# Patient Record
Sex: FEMALE | Race: WHITE | NOT HISPANIC OR LATINO | Employment: OTHER | URBAN - NONMETROPOLITAN AREA
[De-identification: names, ages, dates, MRNs, and addresses within clinical notes are randomized per-mention and may not be internally consistent; named-entity substitution may affect disease eponyms.]

---

## 2017-01-23 ENCOUNTER — OUTSIDE FACILITY SERVICE (OUTPATIENT)
Dept: CARDIOLOGY | Facility: CLINIC | Age: 82
End: 2017-01-23

## 2017-01-23 PROCEDURE — 99223 1ST HOSP IP/OBS HIGH 75: CPT | Performed by: INTERNAL MEDICINE

## 2017-01-24 ENCOUNTER — TELEPHONE (OUTPATIENT)
Dept: CARDIOLOGY | Facility: CLINIC | Age: 82
End: 2017-01-24

## 2017-01-24 ENCOUNTER — OUTSIDE FACILITY SERVICE (OUTPATIENT)
Dept: CARDIOLOGY | Facility: CLINIC | Age: 82
End: 2017-01-24

## 2017-01-24 PROCEDURE — 99232 SBSQ HOSP IP/OBS MODERATE 35: CPT | Performed by: INTERNAL MEDICINE

## 2017-02-22 ENCOUNTER — OFFICE VISIT (OUTPATIENT)
Dept: CARDIOLOGY | Facility: CLINIC | Age: 82
End: 2017-02-22

## 2017-02-22 VITALS
HEIGHT: 66 IN | WEIGHT: 194 LBS | BODY MASS INDEX: 31.18 KG/M2 | DIASTOLIC BLOOD PRESSURE: 62 MMHG | HEART RATE: 60 BPM | SYSTOLIC BLOOD PRESSURE: 126 MMHG

## 2017-02-22 DIAGNOSIS — R53.83 OTHER FATIGUE: ICD-10-CM

## 2017-02-22 DIAGNOSIS — G47.8 AWAKENS FROM SLEEP AT NIGHT: ICD-10-CM

## 2017-02-22 DIAGNOSIS — D35.00 PHEOCHROMOCYTOMA, UNSPECIFIED LATERALITY: ICD-10-CM

## 2017-02-22 DIAGNOSIS — R06.02 SHORTNESS OF BREATH: ICD-10-CM

## 2017-02-22 DIAGNOSIS — I27.20 PULMONARY HYPERTENSION (HCC): Primary | ICD-10-CM

## 2017-02-22 DIAGNOSIS — I15.2 HYPERTENSION DUE TO ENDOCRINE DISORDER: ICD-10-CM

## 2017-02-22 PROCEDURE — 99214 OFFICE O/P EST MOD 30 MIN: CPT | Performed by: NURSE PRACTITIONER

## 2017-02-22 RX ORDER — LOSARTAN POTASSIUM 100 MG/1
100 TABLET ORAL DAILY
COMMUNITY

## 2017-02-22 RX ORDER — ALBUTEROL SULFATE 90 UG/1
2 AEROSOL, METERED RESPIRATORY (INHALATION) EVERY 4 HOURS PRN
COMMUNITY

## 2017-02-22 RX ORDER — FUROSEMIDE 20 MG/1
20 TABLET ORAL DAILY PRN
COMMUNITY

## 2017-02-22 RX ORDER — AMLODIPINE BESYLATE 10 MG/1
10 TABLET ORAL DAILY
COMMUNITY

## 2017-02-22 RX ORDER — CLONIDINE HYDROCHLORIDE 0.2 MG/1
0.2 TABLET ORAL 4 TIMES DAILY
COMMUNITY
End: 2017-02-22

## 2017-02-22 RX ORDER — ESOMEPRAZOLE MAGNESIUM 40 MG/1
40 CAPSULE, DELAYED RELEASE ORAL
COMMUNITY

## 2017-02-22 RX ORDER — SPIRONOLACTONE 25 MG/1
25 TABLET ORAL DAILY
COMMUNITY
End: 2017-02-22

## 2017-02-22 RX ORDER — ALPRAZOLAM 0.25 MG/1
0.25 TABLET ORAL NIGHTLY PRN
COMMUNITY

## 2017-02-22 NOTE — PROGRESS NOTES
Chief Complaint   Patient presents with   • Hospital follow up     still having a lot of SOB/ Weakness.  Son said he's been having to give pt clonidine about 4 times a day.  That that is the only thing that will bring her BP down.  Not sure what BP has been running, hasn't checked it for several days.  Asking to make sure he can still get clonidine filled, pt was not discharged home on it.     • Medication Problem     son with pt states he doesn't like to give her the spironolactone ( 25 mg Daily) , that she seems to feel more weak when she takes. Hasn't taken it for 4-5 days.  Takes Lasix on the average of every other day.    • Med Refill      Doesn't think they need refills, will call back if they need it.    • Lung problems     Follows with Dr Hernandez, hasn't seen him in while, has no appointment.        Subjective       Sun Mcelroy is a 81 y.o. female with a history of hypertension, most likely pheochromocytoma.  She has seen a surgeon at  in the past.  The plan has been medication management given surgery is not recommended. Phenoxybenzamine was recommended but extreme cost made it affordable.  In September 2015, she presented to ER with increasing shortness of breath.  Dr. Saucedo was consult.  Echocardiogram showed preserved LV function.  Right ventricular systolic pressure was reported around 60.  In October 2015, she presented to the emergency room with increased blood pressure.  The dosage of metoprolol was increased.  She was last seen in the office in May 2016 and at that time her vital signs were stable.  Today she comes to the office for a hospital follow up appointment. IN January, she went to the emergency department due to increased shortness of breath.  Echocardiogram at that time showed significant pulmonary hypertension and moderate mitral regurg.  Medication adjustments were made. Today she comes to the office and reports she continues to have shortness of breath and mild generalized  weakness.     HPI         Cardiac History:    Past Surgical History   Procedure Laterality Date   • Cardiovascular stress test  07/27/2013     LMaxx Myoview- (Dr. PARKER) Negative   • Echo - converted  03/24/2014     EF 65%   • Echo - converted  09/09/2015     65% RVSP 60 mmHg   • Echo - converted  09/28/2016     EF 60%, RVSp 49 mmHg, mild MR, bubble study negative   • Echo - converted  01/23/2017     EF > 65%, Moderate MR, RVSP 87 mmHg       Current Outpatient Prescriptions   Medication Sig Dispense Refill   • albuterol (PROVENTIL HFA;VENTOLIN HFA) 108 (90 BASE) MCG/ACT inhaler Inhale 2 puffs Every 4 (Four) Hours As Needed for wheezing.     • albuterol (PROVENTIL) (2.5 MG/3ML) 0.083% nebulizer solution Take 2.5 mg by nebulization every 6 (six) hours as needed for wheezing.     • ALPRAZolam (XANAX) 0.25 MG tablet Take 0.25 mg by mouth At Night As Needed for anxiety.     • amLODIPine (NORVASC) 10 MG tablet Take 10 mg by mouth Daily.     • esomeprazole (nexIUM) 40 MG capsule Take 40 mg by mouth Every Morning Before Breakfast.     • furosemide (LASIX) 20 MG tablet Take 20 mg by mouth Daily As Needed.     • losartan (COZAAR) 100 MG tablet Take 100 mg by mouth Daily.     • metoprolol tartrate (LOPRESSOR) 50 MG tablet TAKE 1 TABLET BY MOUTH TWICE DAILY 180 tablet 1   • CloNIDine (CATAPRES-TTS-2) 0.2 MG/24HR patch Place 1 patch on the skin 1 (One) Time Per Week. 4 patch 11     No current facility-administered medications for this visit.        Lisinopril and Nifedipine    Past Medical History   Diagnosis Date   • H/O CT scan of abdomen 01/06/2014     No Renal Artery Stenosis. (L) Adrenal Adenomas    • H/O: hysterectomy    • Hx of bladder repair surgery      multiple   • Hypertension    • Skin cancer      removal       Social History     Social History   • Marital status:      Spouse name: N/A   • Number of children: N/A   • Years of education: N/A     Occupational History   • Not on file.     Social History Main Topics   •  "Smoking status: Never Smoker   • Smokeless tobacco: Not on file   • Alcohol use No   • Drug use: Not on file   • Sexual activity: Not on file     Other Topics Concern   • Not on file     Social History Narrative       Family History   Problem Relation Age of Onset   • Heart disease Mother      congenital heart defect   • No Known Problems Father    • Heart disease Child      has stents, one has small coronary vessels   • Other Other      1 takes heart medication        Review of Systems   Constitutional: Positive for activity change and fatigue. Negative for appetite change.   HENT: Negative.    Respiratory: Positive for shortness of breath. Negative for wheezing.    Cardiovascular: Negative for chest pain, palpitations and leg swelling.   Gastrointestinal: Negative for abdominal pain and blood in stool.   Genitourinary: Negative for dysuria and hematuria.   Musculoskeletal: Negative for gait problem and myalgias.   Neurological: Positive for weakness and light-headedness. Negative for dizziness, facial asymmetry and speech difficulty.   Hematological: Does not bruise/bleed easily.   Psychiatric/Behavioral: Positive for confusion (at times) and sleep disturbance.       Diabetes- No  Thyroid-normal    Objective     Visit Vitals   • /62   • Pulse 60   • Ht 66\" (167.6 cm)   • Wt 194 lb (88 kg)   • BMI 31.31 kg/m2       Physical Exam   Eyes: Pupils are equal, round, and reactive to light.   Neck: Neck supple.   Cardiovascular: Normal pulses.    Murmur heard.   Systolic murmur is present with a grade of 3/6   Loud S2   Pulmonary/Chest: Tachypnea (slightly with minimal activity) noted. No respiratory distress. She has no wheezes. She has no rales.   Abdominal: Soft. Bowel sounds are normal.   Vitals reviewed.    Procedures        Assessment/Plan      Sun was seen today for hospital follow up, medication problem, med refill and lung problems.    Diagnoses and all orders for this visit:    Pulmonary hypertension  -  "    Overnight Sleep Oximetry Study; Future    Shortness of breath  -     Overnight Sleep Oximetry Study; Future    Awakens from sleep at night  -     Overnight Sleep Oximetry Study; Future    Hypertension due to endocrine disorder  -     Overnight Sleep Oximetry Study; Future    Pheochromocytoma, unspecified laterality    Other fatigue    Other orders  -     CloNIDine (CATAPRES-TTS-2) 0.2 MG/24HR patch; Place 1 patch on the skin 1 (One) Time Per Week.      Her son has been given clonidine routinely and is not always able to check vital signs. We will try changing her clonidine to patch for better delivery of medication due to patient feels her blood pressure fluctuate and causes her episodes of weakness. An overnight oxygen monitor ordered.  I strongly advised her follow up with pulmonologist, Dr. Hernandez. Her son said he would call and reschedule an appointment. If shortness of breath worsens her son understands to call.              Electronically signed by ALINA Cedillo,  February 24, 2017 12:15 PM

## 2017-02-24 PROBLEM — R53.83 FATIGUE: Status: ACTIVE | Noted: 2017-02-24

## 2017-06-19 ENCOUNTER — TELEPHONE (OUTPATIENT)
Dept: CARDIOLOGY | Facility: CLINIC | Age: 82
End: 2017-06-19

## 2017-06-19 RX ORDER — METOPROLOL TARTRATE 50 MG/1
TABLET, FILM COATED ORAL
Qty: 180 TABLET | Refills: 1 | Status: SHIPPED | OUTPATIENT
Start: 2017-06-19

## 2017-06-19 NOTE — TELEPHONE ENCOUNTER
Received call from patient requesting refills be sent into [facility]'s pharmacy for metoprolol, script sent.

## 2017-10-09 ENCOUNTER — TELEPHONE (OUTPATIENT)
Dept: CARDIOLOGY | Facility: CLINIC | Age: 82
End: 2017-10-09

## 2017-10-09 NOTE — TELEPHONE ENCOUNTER
Veronica ABBOTTJONI      Received [facility] from [facility] last week reporting that patient having trouble with edema in legs and feet had a 7 pound weight gain. Called and spoken with son today to follow up on patient, son reports that patient is very feeble hard to get her out of house for appointment's due to shortness of breath and is very weak, states MD 2U comes to patient's home for visit's and was patient was started on fluid pill states has not seen much of a change yet but is going to give it a few more days. I had asked caregiver if patient has been following up with pulmonologist for her PH states no has been over a year ago.     Caregiver was on road and did not have patient's medication's with him for medication list.